# Patient Record
Sex: MALE | Race: WHITE | Employment: STUDENT | URBAN - METROPOLITAN AREA
[De-identification: names, ages, dates, MRNs, and addresses within clinical notes are randomized per-mention and may not be internally consistent; named-entity substitution may affect disease eponyms.]

---
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
## 2020-06-23 ENCOUNTER — TELEPHONE (OUTPATIENT)
Dept: PODIATRY | Facility: CLINIC | Age: 20
End: 2020-06-23

## 2020-06-23 NOTE — TELEPHONE ENCOUNTER
Reason for Call:  Other appointment    Detailed comments: Chinmay tried calling in to schedule for procedure of ingrown toe nail removal. Can't schedule for Masoud.    Please call patient.    Phone Number Patient can be reached at: Cell number on file:    Telephone Information:   Mobile [phone]       Best Time: any    Can we leave a detailed message on this number? YES    Call taken on 6/23/2020 at 3:04 PM by Mynor Burk

## 2020-06-23 NOTE — TELEPHONE ENCOUNTER
I called and spoke to patient. Patient accepted seeing another provider for ingrown nail. Patient was scheduled.    Neli ESPINOZA CMA

## 2020-07-02 ENCOUNTER — OFFICE VISIT (OUTPATIENT)
Dept: PODIATRY | Facility: CLINIC | Age: 20
End: 2020-07-02
Payer: COMMERCIAL

## 2020-07-02 VITALS
HEIGHT: 72 IN | BODY MASS INDEX: 32.51 KG/M2 | DIASTOLIC BLOOD PRESSURE: 80 MMHG | WEIGHT: 240 LBS | SYSTOLIC BLOOD PRESSURE: 122 MMHG

## 2020-07-02 DIAGNOSIS — L60.0 INGROWN TOENAIL: Primary | ICD-10-CM

## 2020-07-02 DIAGNOSIS — M79.674 PAIN OF TOE OF RIGHT FOOT: ICD-10-CM

## 2020-07-02 PROCEDURE — 11750 EXCISION NAIL&NAIL MATRIX: CPT | Mod: T5 | Performed by: PODIATRIST

## 2020-07-02 PROCEDURE — 99213 OFFICE O/P EST LOW 20 MIN: CPT | Mod: 25 | Performed by: PODIATRIST

## 2020-07-02 ASSESSMENT — MIFFLIN-ST. JEOR: SCORE: 2141.63

## 2020-07-02 NOTE — PATIENT INSTRUCTIONS
Thank you for choosing [facility] / Foot [facility] Ankle Surgery!    [facility] SCHEDULE SURGERY: [phone]   [address] BILLING QUESTIONS: [phone]   [place] [address] AFTER HOURS: [phone]   PH: [phone] [employer] LINE:[phone]   FAX: [phone] APPOINTMENTS: [phone]     POST-PERMANENT NAIL REMOVAL  1. Over-the-counter pain medication (tylenol / ibuprofen), elevating your foot, and ice application to the top of the foot is all that you will need for pain control.    2. Some bleeding is normal. If bleeding seems excessive to you, place ice on top of your foot for 15-20 minutes and elevate your foot above heart level.   3. Keep bandage on until this evening or tomorrow morning. If the bandage falls off or if it feels too tight , start the soaking process below.  4. Soaking instructions:   a. Soak your foot twice a day for 15 minutes in a mild solution of warm water and soap for a minimum of 2-4 weeks. If your toe is still draining at 4 weeks, continue with soaking.    b. It s okay to soak your foot for a few minutes to loosen the bandage applied at your appointment.   c. After soaking, use a Q-tip to clean under and around the skin where the nail was removed. This helps get rid of the brownish material and helps the wound drain.    d. Blot your toe dry and apply a band-aid.  5. It is normal to experience some discomfort and redness around the nail for 1-2 weeks following the procedure. Drainage will likely appear brownish and thick at times. This is normal. It might drain for up to 2 months.  7. Initial discomfort might last 2-3 days. You may resume with regular activities at that time, as long as you keep the wound clean and follow the soaking instructions. It is recommended that you do not enter a public swimming pool or hot tub while your toe is draining.   8. After 2-3 days, if you are experiencing worsening pain and redness, or notice  pus, please contact the clinic. Ask to speak with a triage nurse and they will inform our team of your symptoms and we can advise if a follow up is needed.

## 2020-07-02 NOTE — LETTER
"    7/2/2020         RE: Jelani Andre  [address]  [facility] [address]        Dear Colleague,    Thank you for referring your patient, Jelani Andre, to the [facility]. Please see a copy of my visit note below.      ASSESSMENT/PLAN:    Encounter Diagnoses   Name Primary?     Ingrown toenail, medial edge, right hallux Yes     Pain of toe of right foot      I think that a partial chemical matrixectomy is a reasonable option.  The problem is recurrent.  I do not appreciate any clinical signs of infection today.  This procedure worked well for him on his left hallux.    Chemical Matrixectomy/ Permanent nail removal:    The chemical matrixectomy produre was reviewed, including risks, benefits, and post-procedure cares.  The risk of discomfort and infection was discussed.  The chance of nail regrowth discussed.  Written consent was obtained.  The site was marked and  \"Time Out\" called.      The base of the right great toe was injected with 2 cc of  2% Lidocaine plain.  The toe was then prepped with betadine solution.  A tourniquet was applied around the base of the toe to provide hemostasis.   Next the toe was checked for adequate anesthesia.  With the patient comfortable, the medial nail was freed from the nail bed and marginal soft tissue attachments with a blunt instrument.  (A longitudinal cut in the nail was made 2mm from the medial skin fold via a nail splitter.  It was completed under the epinychium with a [place] blade.)  The nail was firmly grasped with a hemostat and removed in total.      Using small applicator sticks, three applications for 30 seconds, of Phenol to the nail matrix were performed.  The area was diluted with a copious amount of isopropyl alcohol.  It was blotted dry.    Next Silvadene ointment was applied to the nail bed, followed by a compressive dressing.  The tourniquet was removed.  The distal toe became immediately pink.  The involved foot " "was kept elevated for several minutes.  Patient tolerated the procedure well. Post-procedure instruction handout provided.    Body mass index is 32.55 kg/m .    Weight management plan: Patient was referred to their PCP to discuss a diet and exercise plan.      Zohaib Vasquez DPM, FACFAS, MS [facility] Department of Podiatry/Foot & Ankle Surgery      ____________________________________________________________________    HPI:          Chief Complaint: ingrown toenail, right hallux, medial edge  Onset of problem: 3 weeks  Pain/ discomfort is described as:  Shooting pain   Ratin/10   Frequency:  Intermittent, when pressed on, conact    Previous treatment: cleansing, trimming  Partial nail avulsion 6 months ago by Dr. Meredith  He is now interested in a partial chemical matrixectomy. He had this done on the left side with good results.     Patient Active Problem List   Diagnosis     Dysthymia   *  *  Past Surgical History:   Procedure Laterality Date     MYRINGOTOMY, INSERT TUBE BILATERAL, COMBINED     *  *  No current outpatient medications on file.       ROS:     A 10-point review of systems was performed and is positive for that noted above in the HPI and as seen below.  All other areas are negative.     Numbness in feet?  no   Calf pain with walking? no  Recent foot/ankle injury? no  Weight change over past 12 months? 15# loss  Self perception as overweight? no  Recent flu-like symptoms? no  Joint pain other than feet ? no    EXAM:    Vitals: /80   Ht 1.829 m (6')   Wt 108.9 kg (240 lb)   BMI 32.55 kg/m    BMI: Body mass index is 32.55 kg/m .  Height: 6' 0\"    Constitutional/ general:  Pt is in no apparent distress, appears well-nourished.  Cooperative with history and physical exam.     Vascular:  Pedal pulses are palpable bilaterally for both the DP and PT arteries.  CFT < 3 sec.  No edema.  Pedal hair growth noted.     Neuro:  Alert and oriented x 3. Coordinated gait.  Light touch sensation is " intact to the L4, L5, S1 distributions. No obvious deficits.  No evidence of neurological-based weakness, spasticity, or contracture in the lower extremities.     Derm: focal light erythema and tenderness, medial skin fold of the right hallux.     Musculoskeletal:    Lower extremity muscle strength is normal.  Patient is ambulatory without an assistive device or brace .  No gross deformities.             Again, thank you for allowing me to participate in the care of your patient.        Sincerely,        Zohaib Vasquez DPM

## 2020-07-02 NOTE — PROGRESS NOTES
"  ASSESSMENT/PLAN:    Encounter Diagnoses   Name Primary?     Ingrown toenail, medial edge, right hallux Yes     Pain of toe of right foot      I think that a partial chemical matrixectomy is a reasonable option.  The problem is recurrent.  I do not appreciate any clinical signs of infection today.  This procedure worked well for him on his left hallux.    Chemical Matrixectomy/ Permanent nail removal:    The chemical matrixectomy produre was reviewed, including risks, benefits, and post-procedure cares.  The risk of discomfort and infection was discussed.  The chance of nail regrowth discussed.  Written consent was obtained.  The site was marked and  \"Time Out\" called.      The base of the right great toe was injected with 2 cc of  2% Lidocaine plain.  The toe was then prepped with betadine solution.  A tourniquet was applied around the base of the toe to provide hemostasis.   Next the toe was checked for adequate anesthesia.  With the patient comfortable, the medial nail was freed from the nail bed and marginal soft tissue attachments with a blunt instrument.  (A longitudinal cut in the nail was made 2mm from the medial skin fold via a nail splitter.  It was completed under the epinychium with a [place] blade.)  The nail was firmly grasped with a hemostat and removed in total.      Using small applicator sticks, three applications for 30 seconds, of Phenol to the nail matrix were performed.  The area was diluted with a copious amount of isopropyl alcohol.  It was blotted dry.    Next Silvadene ointment was applied to the nail bed, followed by a compressive dressing.  The tourniquet was removed.  The distal toe became immediately pink.  The involved foot was kept elevated for several minutes.  Patient tolerated the procedure well. Post-procedure instruction handout provided.    Body mass index is 32.55 kg/m .    Weight management plan: Patient was referred to their PCP to discuss a diet and exercise " "plan.      Zohaib Vasquez, RONALD, FACFAS, MS    [facility] of Podiatry/Foot & Ankle Surgery      ____________________________________________________________________    HPI:          Chief Complaint: ingrown toenail, right hallux, medial edge  Onset of problem: 3 weeks  Pain/ discomfort is described as:  Shooting pain   Ratin/10   Frequency:  Intermittent, when pressed on, conact    Previous treatment: cleansing, trimming  Partial nail avulsion 6 months ago by Dr. Meredith  He is now interested in a partial chemical matrixectomy. He had this done on the left side with good results.     Patient Active Problem List   Diagnosis     Dysthymia   *  *  Past Surgical History:   Procedure Laterality Date     MYRINGOTOMY, INSERT TUBE BILATERAL, COMBINED     *  *  No current outpatient medications on file.       ROS:     A 10-point review of systems was performed and is positive for that noted above in the HPI and as seen below.  All other areas are negative.     Numbness in feet?  no   Calf pain with walking? no  Recent foot/ankle injury? no  Weight change over past 12 months? 15# loss  Self perception as overweight? no  Recent flu-like symptoms? no  Joint pain other than feet ? no    EXAM:    Vitals: /80   Ht 1.829 m (6')   Wt 108.9 kg (240 lb)   BMI 32.55 kg/m    BMI: Body mass index is 32.55 kg/m .  Height: 6' 0\"    Constitutional/ general:  Pt is in no apparent distress, appears well-nourished.  Cooperative with history and physical exam.     Vascular:  Pedal pulses are palpable bilaterally for both the DP and PT arteries.  CFT < 3 sec.  No edema.  Pedal hair growth noted.     Neuro:  Alert and oriented x 3. Coordinated gait.  Light touch sensation is intact to the L4, L5, S1 distributions. No obvious deficits.  No evidence of neurological-based weakness, spasticity, or contracture in the lower extremities.     Derm: focal light erythema and tenderness, medial skin fold of the right hallux. "     Musculoskeletal:    Lower extremity muscle strength is normal.  Patient is ambulatory without an assistive device or brace .  No gross deformities.